# Patient Record
Sex: MALE | Race: WHITE | NOT HISPANIC OR LATINO | Employment: FULL TIME | URBAN - METROPOLITAN AREA
[De-identification: names, ages, dates, MRNs, and addresses within clinical notes are randomized per-mention and may not be internally consistent; named-entity substitution may affect disease eponyms.]

---

## 2017-03-01 ENCOUNTER — CLINICAL SUPPORT (OUTPATIENT)
Dept: CARDIOLOGY | Facility: CLINIC | Age: 57
End: 2017-03-01
Payer: COMMERCIAL

## 2017-03-01 ENCOUNTER — OFFICE VISIT (OUTPATIENT)
Dept: CARDIOLOGY | Facility: CLINIC | Age: 57
End: 2017-03-01
Payer: COMMERCIAL

## 2017-03-01 VITALS
HEIGHT: 67 IN | HEART RATE: 53 BPM | SYSTOLIC BLOOD PRESSURE: 116 MMHG | DIASTOLIC BLOOD PRESSURE: 80 MMHG | WEIGHT: 158 LBS | BODY MASS INDEX: 24.8 KG/M2

## 2017-03-01 DIAGNOSIS — Z82.49 FAMILY HISTORY OF PREMATURE CAD: ICD-10-CM

## 2017-03-01 DIAGNOSIS — E78.5 HYPERLIPIDEMIA, UNSPECIFIED HYPERLIPIDEMIA TYPE: Primary | ICD-10-CM

## 2017-03-01 DIAGNOSIS — E78.5 HYPERLIPIDEMIA, UNSPECIFIED HYPERLIPIDEMIA TYPE: ICD-10-CM

## 2017-03-01 DIAGNOSIS — I34.1 MITRAL VALVE PROLAPSE: ICD-10-CM

## 2017-03-01 PROCEDURE — 99212 OFFICE O/P EST SF 10 MIN: CPT | Mod: S$GLB,,, | Performed by: INTERNAL MEDICINE

## 2017-03-01 PROCEDURE — 1160F RVW MEDS BY RX/DR IN RCRD: CPT | Mod: S$GLB,,, | Performed by: INTERNAL MEDICINE

## 2017-03-01 PROCEDURE — 99999 PR PBB SHADOW E&M-EST. PATIENT-LVL III: CPT | Mod: PBBFAC,,, | Performed by: INTERNAL MEDICINE

## 2017-03-01 PROCEDURE — 93000 ELECTROCARDIOGRAM COMPLETE: CPT | Mod: S$GLB,,, | Performed by: INTERNAL MEDICINE

## 2017-03-01 NOTE — MR AVS SNAPSHOT
[facility] - Cardiology  [address] [facility] [place] [address]  Phone: [phone]  Fax: [phone]                  [place]   3/1/2017 11:15 AM   Office Visit    Description:  Male : 1960   Provider:  Elizabeth Suarez MD   Department:  [facility]           Diagnoses this Visit        Comments    Hyperlipidemia, unspecified hyperlipidemia type    -  Primary     Family history of premature CAD         Mitral valve prolapse                To Do List           Goals (5 Years of Data)     None      Follow-Up and Disposition     Return in about 1 year (around 3/1/2018).      [facility]     [facility] -  Assistance  Registered nurses in the [facility] provide clinical advisement, health education, appointment booking, and other advisory services.  Call for this free service at [phone].             Medications           Message regarding Medications     Verify the changes and/or additions to your medication regime listed below are the same as discussed with your clinician today.  If any of these changes or additions are incorrect, please notify your healthcare provider.             Verify that the below list of medications is an accurate representation of the medications you are currently taking.  If none reported, the list may be blank. If incorrect, please contact your healthcare provider. Carry this list with you in case of emergency.           Current Medications     aspirin (ECOTRIN) 81 MG EC tablet Take 1 tablet (81 mg total) by mouth once daily.    omeprazole (PRILOSEC) 20 MG capsule Take 20 mg by mouth once daily.    simvastatin (ZOCOR) 20 MG tablet TAKE 1 TABLET BY MOUTH EVERY EVENING           Clinical Reference Information           Your Vitals Were     BP                   116/80 (BP Location: Right arm, Patient Position: Sitting, BP Method: Manual)           Blood Pressure          Most Recent Value    Right Arm BP - Sitting  116/80     Left Arm BP - Sitting  112/78    BP  116/80      Allergies as of 3/1/2017     No Known Allergies      Immunizations Administered on Date of Encounter - 3/1/2017     None      Orders Placed During Today's Visit     Future Labs/Procedures Expected by Expires    Comprehensive metabolic panel  3/1/2017 (Approximate) 3/1/2018    Lipid panel  3/1/2017 (Approximate) 3/1/2018    SCHEDULED EKG 12-LEAD (to Muse)  As directed 3/1/2018      [facility] Sign-Up     Activating your [facility] account is as easy as 1-2-3!     1) Visit [email or web address], select Sign Up Now, enter this activation code and your date of birth, then select Next.  [email or web address]  Expires: 4/15/2017 11:54 AM      2) Create a username and password to use when you visit [facility] in the future and select a security question in case you lose your password and select Next.    3) Enter your e-mail address and click Sign Up!    Additional Information  If you have questions, please e-mail [email or web address] or call [phone] to talk to our [facility] staff. Remember, [facility] is NOT to be used for urgent needs. For medical emergencies, dial 911.         Language Assistance Services     ATTENTION: Language assistance services are available, free of charge. Please call [phone].      ATENCIÓN: Si habla español, tiene a floyd disposición servicios gratuitos de asistencia lingüística. Llame al [phone].     CHÚ Ý: N?u b?n nói Ti?ng Vi?t, có các d?ch v? h? tr? ngôn ng? mi?n phí dành cho b?n. G?i s? [phone].         [facility] complies with applicable Federal civil rights laws and does not discriminate on the basis of race, color, national origin, age, disability, or sex.

## 2017-03-01 NOTE — PROGRESS NOTES
Subjective:   Patient ID:  [place] is a 57 y.o. male who presents for follow up of No chief complaint on file.      HPI  A 56 yo male with premature fh cad he is here for f/u . He ahs been compliant with exercise diet busy at work not sleeping very well.asymptomatic cardiac wise ekg is normal.  Past Medical History:   Diagnosis Date    Family history of premature CAD 8/14/2013    Hyperlipidemia     Mitral valve prolapse        Past Surgical History:   Procedure Laterality Date    TONSILLECTOMY, ADENOIDECTOMY         Social History   Substance Use Topics    Smoking status: Never Smoker    Smokeless tobacco: Never Used    Alcohol use 3.6 oz/week     3 Cans of beer, 3 Shots of liquor per week       Family History   Problem Relation Age of Onset    Heart disease Father     Heart disease Brother     Heart disease Paternal Grandfather        Current Outpatient Prescriptions   Medication Sig    aspirin (ECOTRIN) 81 MG EC tablet Take 1 tablet (81 mg total) by mouth once daily.    omeprazole (PRILOSEC) 20 MG capsule Take 20 mg by mouth once daily.    simvastatin (ZOCOR) 20 MG tablet TAKE 1 TABLET BY MOUTH EVERY EVENING     No current facility-administered medications for this visit.      Current Outpatient Prescriptions on File Prior to Visit   Medication Sig    aspirin (ECOTRIN) 81 MG EC tablet Take 1 tablet (81 mg total) by mouth once daily.    omeprazole (PRILOSEC) 20 MG capsule Take 20 mg by mouth once daily.    simvastatin (ZOCOR) 20 MG tablet TAKE 1 TABLET BY MOUTH EVERY EVENING     No current facility-administered medications on file prior to visit.      Review of patient's allergies indicates:  No Known Allergies  Review of Systems   Constitution: Negative for weakness and malaise/fatigue.   HENT: Negative for headaches.    Eyes: Negative for blurred vision.   Cardiovascular: Negative for chest pain, claudication, cyanosis, dyspnea on exertion, irregular heartbeat, leg swelling, near-syncope,  orthopnea, palpitations and paroxysmal nocturnal dyspnea.   Respiratory: Negative for cough, hemoptysis and shortness of breath.    Hematologic/Lymphatic: Negative for bleeding problem. Does not bruise/bleed easily.   Skin: Negative for dry skin and itching.   Musculoskeletal: Negative for falls, muscle weakness and myalgias.   Gastrointestinal: Negative for abdominal pain, diarrhea, heartburn, hematemesis, hematochezia and melena.   Genitourinary: Negative for flank pain and hematuria.   Neurological: Negative for dizziness, focal weakness, light-headedness, numbness, paresthesias and seizures.   Psychiatric/Behavioral: Negative for altered mental status and memory loss. The patient is not nervous/anxious.    Allergic/Immunologic: Negative for hives.       Objective:   Physical Exam   Constitutional: He is oriented to person, place, and time. He appears well-developed and well-nourished. No distress.   HENT:   Head: Normocephalic and atraumatic.   Eyes: EOM are normal. Pupils are equal, round, and reactive to light. Right eye exhibits no discharge. Left eye exhibits no discharge.   Neck: Neck supple. No JVD present. No thyromegaly present.   Cardiovascular: Normal rate, regular rhythm, normal heart sounds and intact distal pulses.  Exam reveals no gallop and no friction rub.    No murmur heard.  Pulmonary/Chest: Effort normal and breath sounds normal. No respiratory distress. He has no wheezes. He has no rales. He exhibits no tenderness.   Abdominal: Soft. Bowel sounds are normal. He exhibits no distension. There is no tenderness.   Musculoskeletal: Normal range of motion. He exhibits no edema.   Neurological: He is alert and oriented to person, place, and time. No cranial nerve deficit.   Skin: Skin is warm and dry. No rash noted. He is not diaphoretic. No erythema.   Psychiatric: He has a normal mood and affect. His behavior is normal.   Nursing note and vitals reviewed.    Vitals:    03/01/17 1133   BP: 116/80  "  BP Location: Right arm   Patient Position: Sitting   BP Method: Manual   Pulse: (!) 53   Weight: 71.7 kg (158 lb)   Height: 5' 7" (1.702 m)     Lab Results   Component Value Date    CHOL 150 02/29/2016    CHOL 150 08/29/2015    CHOL 141 08/18/2014     Lab Results   Component Value Date    HDL 55 02/29/2016    HDL 55 08/29/2015    HDL 55 08/18/2014     Lab Results   Component Value Date    LDLCALC 83.6 02/29/2016    LDLCALC 81.6 08/29/2015    LDLCALC 75.2 08/18/2014     Lab Results   Component Value Date    TRIG 57 02/29/2016    TRIG 67 08/29/2015    TRIG 54 08/18/2014     Lab Results   Component Value Date    CHOLHDL 36.7 02/29/2016    CHOLHDL 36.7 08/29/2015    CHOLHDL 39.0 08/18/2014       Chemistry        Component Value Date/Time     02/29/2016 0753    K 4.0 02/29/2016 0753     02/29/2016 0753    CO2 26 02/29/2016 0753    BUN 12 02/29/2016 0753    CREATININE 1.0 02/29/2016 0753    GLU 97 02/29/2016 0753        Component Value Date/Time    CALCIUM 9.0 02/29/2016 0753    ALKPHOS 63 02/29/2016 0753    AST 21 02/29/2016 0753    ALT 32 02/29/2016 0753    BILITOT 1.4 (H) 02/29/2016 0753          Lab Results   Component Value Date    TSH 2.446 08/29/2015     No results found for: INR, PROTIME  Lab Results   Component Value Date    WBC 4.75 08/29/2015    HGB 14.3 08/29/2015    HCT 41.1 08/29/2015    MCV 93 08/29/2015     08/29/2015     BMP  Sodium   Date Value Ref Range Status   02/29/2016 142 136 - 145 mmol/L Final     Potassium   Date Value Ref Range Status   02/29/2016 4.0 3.5 - 5.1 mmol/L Final     Chloride   Date Value Ref Range Status   02/29/2016 108 95 - 110 mmol/L Final     CO2   Date Value Ref Range Status   02/29/2016 26 23 - 29 mmol/L Final     BUN, Bld   Date Value Ref Range Status   02/29/2016 12 6 - 20 mg/dL Final     Creatinine   Date Value Ref Range Status   02/29/2016 1.0 0.5 - 1.4 mg/dL Final     Calcium   Date Value Ref Range Status   02/29/2016 9.0 8.7 - 10.5 mg/dL Final "     Anion Gap   Date Value Ref Range Status   02/29/2016 8 8 - 16 mmol/L Final     eGFR if    Date Value Ref Range Status   02/29/2016 >60.0 >60 mL/min/1.73 m^2 Final     eGFR if non    Date Value Ref Range Status   02/29/2016 >60.0 >60 mL/min/1.73 m^2 Final     Comment:     Calculation used to obtain the estimated glomerular filtration  rate (eGFR) is the CKD-EPI equation. Since race is unknown   in our information system, the eGFR values for   -American and Non--American patients are given   for each creatinine result.       CrCl cannot be calculated (Patient has no serum creatinine result on file.).    Assessment:     1. Hyperlipidemia, unspecified hyperlipidemia type    2. Family history of premature CAD    3. Mitral valve prolapse      Stable needs labs.   Plan:     Continue current therapy  Cardiac low salt diet.  Risk factor modification and excercise program.  Yearly f/u   Needs labs .

## 2017-08-02 RX ORDER — SIMVASTATIN 20 MG/1
TABLET, FILM COATED ORAL
Qty: 30 TABLET | Refills: 0 | Status: SHIPPED | OUTPATIENT
Start: 2017-08-02 | End: 2017-10-03 | Stop reason: SDUPTHER

## 2017-10-03 RX ORDER — SIMVASTATIN 20 MG/1
TABLET, FILM COATED ORAL
Qty: 30 TABLET | Refills: 6 | Status: SHIPPED | OUTPATIENT
Start: 2017-10-03 | End: 2018-05-09 | Stop reason: SDUPTHER

## 2018-02-19 ENCOUNTER — LAB VISIT (OUTPATIENT)
Dept: LAB | Facility: HOSPITAL | Age: 58
End: 2018-02-19
Attending: INTERNAL MEDICINE
Payer: COMMERCIAL

## 2018-02-19 DIAGNOSIS — E78.5 HYPERLIPIDEMIA, UNSPECIFIED HYPERLIPIDEMIA TYPE: ICD-10-CM

## 2018-02-19 LAB
ALBUMIN SERPL BCP-MCNC: 3.9 G/DL
ALP SERPL-CCNC: 61 U/L
ALT SERPL W/O P-5'-P-CCNC: 37 U/L
ANION GAP SERPL CALC-SCNC: 8 MMOL/L
AST SERPL-CCNC: 26 U/L
BILIRUB SERPL-MCNC: 2.2 MG/DL
BUN SERPL-MCNC: 13 MG/DL
CALCIUM SERPL-MCNC: 9.1 MG/DL
CHLORIDE SERPL-SCNC: 108 MMOL/L
CHOLEST SERPL-MCNC: 147 MG/DL
CHOLEST/HDLC SERPL: 2.5 {RATIO}
CO2 SERPL-SCNC: 25 MMOL/L
CREAT SERPL-MCNC: 1.1 MG/DL
EST. GFR  (AFRICAN AMERICAN): >60 ML/MIN/1.73 M^2
EST. GFR  (NON AFRICAN AMERICAN): >60 ML/MIN/1.73 M^2
GLUCOSE SERPL-MCNC: 80 MG/DL
HDLC SERPL-MCNC: 60 MG/DL
HDLC SERPL: 40.8 %
LDLC SERPL CALC-MCNC: 77 MG/DL
NONHDLC SERPL-MCNC: 87 MG/DL
POTASSIUM SERPL-SCNC: 4.1 MMOL/L
PROT SERPL-MCNC: 6.5 G/DL
SODIUM SERPL-SCNC: 141 MMOL/L
TRIGL SERPL-MCNC: 50 MG/DL

## 2018-02-19 PROCEDURE — 36415 COLL VENOUS BLD VENIPUNCTURE: CPT | Mod: PO

## 2018-02-19 PROCEDURE — 80053 COMPREHEN METABOLIC PANEL: CPT

## 2018-02-19 PROCEDURE — 80061 LIPID PANEL: CPT

## 2018-02-20 DIAGNOSIS — I34.1 MITRAL VALVE PROLAPSE: Primary | ICD-10-CM

## 2018-02-21 ENCOUNTER — CLINICAL SUPPORT (OUTPATIENT)
Dept: CARDIOLOGY | Facility: CLINIC | Age: 58
End: 2018-02-21
Payer: COMMERCIAL

## 2018-02-21 ENCOUNTER — OFFICE VISIT (OUTPATIENT)
Dept: CARDIOLOGY | Facility: CLINIC | Age: 58
End: 2018-02-21
Payer: COMMERCIAL

## 2018-02-21 VITALS
WEIGHT: 160 LBS | HEART RATE: 60 BPM | BODY MASS INDEX: 25.11 KG/M2 | HEIGHT: 67 IN | DIASTOLIC BLOOD PRESSURE: 60 MMHG | SYSTOLIC BLOOD PRESSURE: 102 MMHG

## 2018-02-21 DIAGNOSIS — Z82.49 FAMILY HISTORY OF PREMATURE CAD: ICD-10-CM

## 2018-02-21 DIAGNOSIS — I34.1 MITRAL VALVE PROLAPSE: ICD-10-CM

## 2018-02-21 DIAGNOSIS — E78.5 HYPERLIPIDEMIA, UNSPECIFIED HYPERLIPIDEMIA TYPE: Primary | ICD-10-CM

## 2018-02-21 PROCEDURE — 99999 PR PBB SHADOW E&M-EST. PATIENT-LVL III: CPT | Mod: PBBFAC,,, | Performed by: INTERNAL MEDICINE

## 2018-02-21 PROCEDURE — 3008F BODY MASS INDEX DOCD: CPT | Mod: S$GLB,,, | Performed by: INTERNAL MEDICINE

## 2018-02-21 PROCEDURE — 93000 ELECTROCARDIOGRAM COMPLETE: CPT | Mod: S$GLB,,, | Performed by: INTERNAL MEDICINE

## 2018-02-21 PROCEDURE — 99212 OFFICE O/P EST SF 10 MIN: CPT | Mod: S$GLB,,, | Performed by: INTERNAL MEDICINE

## 2018-02-22 NOTE — PROGRESS NOTES
Subjective:   Patient ID:  [place] is a 58 y.o. male who presents for follow up of Mitral Valve Prolapse (Patient presents to clinic today for annual cardiology follow up. ) and Hyperlipidemia      HPI  A 57 yo male with hlp is here fro f/u he is exercuising complaint with diet takes meds lipids on target. Has no cardiovascular symptoms ekg normal.  Past Medical History:   Diagnosis Date    Family history of premature CAD 8/14/2013    Hyperlipidemia     Mitral valve prolapse        Past Surgical History:   Procedure Laterality Date    TONSILLECTOMY, ADENOIDECTOMY         Social History   Substance Use Topics    Smoking status: Never Smoker    Smokeless tobacco: Never Used    Alcohol use 3.6 oz/week     3 Cans of beer, 3 Shots of liquor per week       Family History   Problem Relation Age of Onset    Heart disease Father     Heart disease Brother     Heart disease Paternal Grandfather        Current Outpatient Prescriptions   Medication Sig    aspirin (ECOTRIN) 81 MG EC tablet Take 1 tablet (81 mg total) by mouth once daily.    omeprazole (PRILOSEC) 20 MG capsule Take 20 mg by mouth once daily.    simvastatin (ZOCOR) 20 MG tablet TAKE 1 TABLET BY MOUTH EVERY EVENING     No current facility-administered medications for this visit.      Current Outpatient Prescriptions on File Prior to Visit   Medication Sig    aspirin (ECOTRIN) 81 MG EC tablet Take 1 tablet (81 mg total) by mouth once daily.    omeprazole (PRILOSEC) 20 MG capsule Take 20 mg by mouth once daily.    simvastatin (ZOCOR) 20 MG tablet TAKE 1 TABLET BY MOUTH EVERY EVENING     No current facility-administered medications on file prior to visit.      Review of patient's allergies indicates:  No Known Allergies  Review of Systems   Constitution: Negative for weakness and malaise/fatigue.   Eyes: Negative for blurred vision.   Cardiovascular: Negative for chest pain, claudication, cyanosis, dyspnea on exertion, irregular heartbeat, leg  swelling, near-syncope, orthopnea, palpitations and paroxysmal nocturnal dyspnea.   Respiratory: Negative for cough, hemoptysis and shortness of breath.    Hematologic/Lymphatic: Negative for bleeding problem. Does not bruise/bleed easily.   Skin: Negative for dry skin and itching.   Musculoskeletal: Negative for falls, muscle weakness and myalgias.   Gastrointestinal: Negative for abdominal pain, diarrhea, heartburn, hematemesis, hematochezia and melena.   Genitourinary: Negative for flank pain and hematuria.   Neurological: Negative for dizziness, focal weakness, headaches, light-headedness, numbness, paresthesias and seizures.   Psychiatric/Behavioral: Negative for altered mental status and memory loss. The patient is not nervous/anxious.    Allergic/Immunologic: Negative for hives.       Objective:   Physical Exam   Constitutional: He is oriented to person, place, and time. He appears well-developed and well-nourished. No distress.   HENT:   Head: Normocephalic and atraumatic.   Eyes: EOM are normal. Pupils are equal, round, and reactive to light. Right eye exhibits no discharge. Left eye exhibits no discharge.   Neck: Neck supple. No JVD present. No thyromegaly present.   Cardiovascular: Normal rate, regular rhythm, normal heart sounds and intact distal pulses.  Exam reveals no gallop and no friction rub.    No murmur heard.  Pulmonary/Chest: Effort normal and breath sounds normal. No respiratory distress. He has no wheezes. He has no rales. He exhibits no tenderness.   Abdominal: Soft. Bowel sounds are normal. He exhibits no distension. There is no tenderness.   Musculoskeletal: Normal range of motion. He exhibits no edema.   Neurological: He is alert and oriented to person, place, and time. No cranial nerve deficit.   Skin: Skin is warm and dry. No rash noted. He is not diaphoretic. No erythema.   Psychiatric: He has a normal mood and affect. His behavior is normal.   Nursing note and vitals  "reviewed.    Vitals:    02/21/18 1735   BP: 102/60   Pulse: 60   Weight: 72.6 kg (160 lb)   Height: 5' 7" (1.702 m)     Lab Results   Component Value Date    CHOL 147 02/19/2018    CHOL 150 02/29/2016    CHOL 150 08/29/2015     Lab Results   Component Value Date    HDL 60 02/19/2018    HDL 55 02/29/2016    HDL 55 08/29/2015     Lab Results   Component Value Date    LDLCALC 77.0 02/19/2018    LDLCALC 83.6 02/29/2016    LDLCALC 81.6 08/29/2015     Lab Results   Component Value Date    TRIG 50 02/19/2018    TRIG 57 02/29/2016    TRIG 67 08/29/2015     Lab Results   Component Value Date    CHOLHDL 40.8 02/19/2018    CHOLHDL 36.7 02/29/2016    CHOLHDL 36.7 08/29/2015       Chemistry        Component Value Date/Time     02/19/2018 0751    K 4.1 02/19/2018 0751     02/19/2018 0751    CO2 25 02/19/2018 0751    BUN 13 02/19/2018 0751    CREATININE 1.1 02/19/2018 0751    GLU 80 02/19/2018 0751        Component Value Date/Time    CALCIUM 9.1 02/19/2018 0751    ALKPHOS 61 02/19/2018 0751    AST 26 02/19/2018 0751    ALT 37 02/19/2018 0751    BILITOT 2.2 (H) 02/19/2018 0751    ESTGFRAFRICA >60.0 02/19/2018 0751    EGFRNONAA >60.0 02/19/2018 0751          Lab Results   Component Value Date    TSH 2.446 08/29/2015     No results found for: INR, PROTIME  Lab Results   Component Value Date    WBC 4.75 08/29/2015    HGB 14.3 08/29/2015    HCT 41.1 08/29/2015    MCV 93 08/29/2015     08/29/2015     BMP  Sodium   Date Value Ref Range Status   02/19/2018 141 136 - 145 mmol/L Final     Potassium   Date Value Ref Range Status   02/19/2018 4.1 3.5 - 5.1 mmol/L Final     Chloride   Date Value Ref Range Status   02/19/2018 108 95 - 110 mmol/L Final     CO2   Date Value Ref Range Status   02/19/2018 25 23 - 29 mmol/L Final     BUN, Bld   Date Value Ref Range Status   02/19/2018 13 6 - 20 mg/dL Final     Creatinine   Date Value Ref Range Status   02/19/2018 1.1 0.5 - 1.4 mg/dL Final     Calcium   Date Value Ref Range Status "   02/19/2018 9.1 8.7 - 10.5 mg/dL Final     Anion Gap   Date Value Ref Range Status   02/19/2018 8 8 - 16 mmol/L Final     eGFR if    Date Value Ref Range Status   02/19/2018 >60.0 >60 mL/min/1.73 m^2 Final     eGFR if non    Date Value Ref Range Status   02/19/2018 >60.0 >60 mL/min/1.73 m^2 Final     Comment:     Calculation used to obtain the estimated glomerular filtration  rate (eGFR) is the CKD-EPI equation.        Estimated Creatinine Clearance: 69.3 mL/min (based on SCr of 1.1 mg/dL).    Assessment:     1. Hyperlipidemia, unspecified hyperlipidemia type    2. Mitral valve prolapse    3. Family history of premature CAD      Asymptomatic doing well clinically.  Plan:   Continue current therapy  Cardiac low salt diet.  Risk factor modification and excercise program.  F/u in 1 year   Needs 6 months lipids cmp.

## 2018-05-09 RX ORDER — SIMVASTATIN 20 MG/1
TABLET, FILM COATED ORAL
Qty: 30 TABLET | Refills: 0 | Status: SHIPPED | OUTPATIENT
Start: 2018-05-09 | End: 2018-07-15 | Stop reason: SDUPTHER

## 2018-07-15 RX ORDER — SIMVASTATIN 20 MG/1
TABLET, FILM COATED ORAL
Qty: 30 TABLET | Refills: 0 | Status: SHIPPED | OUTPATIENT
Start: 2018-07-15 | End: 2018-08-20 | Stop reason: SDUPTHER

## 2018-08-20 RX ORDER — SIMVASTATIN 20 MG/1
TABLET, FILM COATED ORAL
Qty: 30 TABLET | Refills: 6 | Status: SHIPPED | OUTPATIENT
Start: 2018-08-20 | End: 2019-06-27 | Stop reason: SDUPTHER

## 2019-03-22 ENCOUNTER — LAB VISIT (OUTPATIENT)
Dept: LAB | Facility: HOSPITAL | Age: 59
End: 2019-03-22
Attending: INTERNAL MEDICINE
Payer: COMMERCIAL

## 2019-03-22 DIAGNOSIS — E78.5 HYPERLIPIDEMIA, UNSPECIFIED HYPERLIPIDEMIA TYPE: ICD-10-CM

## 2019-03-22 DIAGNOSIS — I25.10 CORONARY ARTERY DISEASE, ANGINA PRESENCE UNSPECIFIED, UNSPECIFIED VESSEL OR LESION TYPE, UNSPECIFIED WHETHER NATIVE OR TRANSPLANTED HEART: ICD-10-CM

## 2019-03-22 DIAGNOSIS — E78.5 HYPERLIPIDEMIA, UNSPECIFIED HYPERLIPIDEMIA TYPE: Primary | ICD-10-CM

## 2019-03-22 LAB
ALBUMIN SERPL BCP-MCNC: 4.2 G/DL
ALP SERPL-CCNC: 74 U/L
ALT SERPL W/O P-5'-P-CCNC: 35 U/L
ANION GAP SERPL CALC-SCNC: 9 MMOL/L
AST SERPL-CCNC: 24 U/L
BILIRUB SERPL-MCNC: 1.9 MG/DL
BUN SERPL-MCNC: 14 MG/DL
CALCIUM SERPL-MCNC: 9.6 MG/DL
CHLORIDE SERPL-SCNC: 106 MMOL/L
CHOLEST SERPL-MCNC: 170 MG/DL
CHOLEST/HDLC SERPL: 2.7 {RATIO}
CO2 SERPL-SCNC: 26 MMOL/L
CREAT SERPL-MCNC: 1.1 MG/DL
EST. GFR  (AFRICAN AMERICAN): >60 ML/MIN/1.73 M^2
EST. GFR  (NON AFRICAN AMERICAN): >60 ML/MIN/1.73 M^2
GLUCOSE SERPL-MCNC: 83 MG/DL
HDLC SERPL-MCNC: 64 MG/DL
HDLC SERPL: 37.6 %
LDLC SERPL CALC-MCNC: 92 MG/DL
NONHDLC SERPL-MCNC: 106 MG/DL
POTASSIUM SERPL-SCNC: 4.1 MMOL/L
PROT SERPL-MCNC: 6.7 G/DL
SODIUM SERPL-SCNC: 141 MMOL/L
TRIGL SERPL-MCNC: 70 MG/DL

## 2019-03-22 PROCEDURE — 80053 COMPREHEN METABOLIC PANEL: CPT

## 2019-03-22 PROCEDURE — 80061 LIPID PANEL: CPT

## 2019-03-22 PROCEDURE — 36415 COLL VENOUS BLD VENIPUNCTURE: CPT

## 2019-03-26 DIAGNOSIS — I34.1 MITRAL VALVE PROLAPSE: Primary | ICD-10-CM

## 2019-03-27 ENCOUNTER — CLINICAL SUPPORT (OUTPATIENT)
Dept: CARDIOLOGY | Facility: CLINIC | Age: 59
End: 2019-03-27
Payer: COMMERCIAL

## 2019-03-27 ENCOUNTER — OFFICE VISIT (OUTPATIENT)
Dept: CARDIOLOGY | Facility: CLINIC | Age: 59
End: 2019-03-27
Payer: COMMERCIAL

## 2019-03-27 VITALS
WEIGHT: 158 LBS | HEART RATE: 55 BPM | SYSTOLIC BLOOD PRESSURE: 108 MMHG | DIASTOLIC BLOOD PRESSURE: 66 MMHG | HEIGHT: 67 IN | BODY MASS INDEX: 24.8 KG/M2

## 2019-03-27 DIAGNOSIS — I34.1 MITRAL VALVE PROLAPSE: ICD-10-CM

## 2019-03-27 DIAGNOSIS — Z82.49 FAMILY HISTORY OF PREMATURE CAD: ICD-10-CM

## 2019-03-27 DIAGNOSIS — E78.5 HYPERLIPIDEMIA, UNSPECIFIED HYPERLIPIDEMIA TYPE: Primary | ICD-10-CM

## 2019-03-27 DIAGNOSIS — R55 SYNCOPE AND COLLAPSE: ICD-10-CM

## 2019-03-27 PROCEDURE — 3008F BODY MASS INDEX DOCD: CPT | Mod: CPTII,S$GLB,, | Performed by: INTERNAL MEDICINE

## 2019-03-27 PROCEDURE — 99999 PR PBB SHADOW E&M-EST. PATIENT-LVL III: ICD-10-PCS | Mod: PBBFAC,,, | Performed by: INTERNAL MEDICINE

## 2019-03-27 PROCEDURE — 3008F PR BODY MASS INDEX (BMI) DOCUMENTED: ICD-10-PCS | Mod: CPTII,S$GLB,, | Performed by: INTERNAL MEDICINE

## 2019-03-27 PROCEDURE — 93000 EKG 12-LEAD: ICD-10-PCS | Mod: S$GLB,,, | Performed by: INTERNAL MEDICINE

## 2019-03-27 PROCEDURE — 99214 OFFICE O/P EST MOD 30 MIN: CPT | Mod: S$GLB,,, | Performed by: INTERNAL MEDICINE

## 2019-03-27 PROCEDURE — 99999 PR PBB SHADOW E&M-EST. PATIENT-LVL III: CPT | Mod: PBBFAC,,, | Performed by: INTERNAL MEDICINE

## 2019-03-27 PROCEDURE — 93000 ELECTROCARDIOGRAM COMPLETE: CPT | Mod: S$GLB,,, | Performed by: INTERNAL MEDICINE

## 2019-03-27 PROCEDURE — 99214 PR OFFICE/OUTPT VISIT, EST, LEVL IV, 30-39 MIN: ICD-10-PCS | Mod: S$GLB,,, | Performed by: INTERNAL MEDICINE

## 2019-03-27 NOTE — PROGRESS NOTES
Subjective:   Patient ID:  Tucker Bay is a 59 y.o. male who presents for follow up of Annual Exam; Hyperlipidemia; and Loss of Consciousness      HPI  A 58 yo male with fh cad hyperlipidemia is here for f/u. He ahd an episode of fainting after being in hot place after drinking few margueritas ate and probably got vagal. He has no recurrent symptoms. He exercises three times weekly walks daily ha ssome knee pain no chest pain shortness of breath.has been eating more than usual not compliant.has not been taking statins regularily.  Past Medical History:   Diagnosis Date    Family history of premature CAD 8/14/2013    Hyperlipidemia     Mitral valve prolapse        Past Surgical History:   Procedure Laterality Date    TONSILLECTOMY, ADENOIDECTOMY         Social History     Tobacco Use    Smoking status: Never Smoker    Smokeless tobacco: Never Used   Substance Use Topics    Alcohol use: Yes     Alcohol/week: 3.6 oz     Types: 3 Cans of beer, 3 Shots of liquor per week    Drug use: No       Family History   Problem Relation Age of Onset    Heart disease Father     Heart disease Brother     Heart disease Paternal Grandfather        Current Outpatient Medications   Medication Sig    aspirin (ECOTRIN) 81 MG EC tablet Take 1 tablet (81 mg total) by mouth once daily.    omeprazole (PRILOSEC) 20 MG capsule Take 20 mg by mouth once daily.    simvastatin (ZOCOR) 20 MG tablet TAKE 1 TABLET BY MOUTH EVERY EVENING     No current facility-administered medications for this visit.      Current Outpatient Medications on File Prior to Visit   Medication Sig    aspirin (ECOTRIN) 81 MG EC tablet Take 1 tablet (81 mg total) by mouth once daily.    omeprazole (PRILOSEC) 20 MG capsule Take 20 mg by mouth once daily.    simvastatin (ZOCOR) 20 MG tablet TAKE 1 TABLET BY MOUTH EVERY EVENING     No current facility-administered medications on file prior to visit.      Review of patient's allergies indicates:  No Known  Allergies  Review of Systems   Constitution: Negative for malaise/fatigue.   Eyes: Negative for blurred vision.   Cardiovascular: Negative for chest pain, claudication, cyanosis, dyspnea on exertion, irregular heartbeat, leg swelling, near-syncope, orthopnea, palpitations and paroxysmal nocturnal dyspnea.   Respiratory: Negative for cough, hemoptysis and shortness of breath.    Hematologic/Lymphatic: Negative for bleeding problem. Does not bruise/bleed easily.   Skin: Negative for dry skin and itching.   Musculoskeletal: Positive for arthritis and joint pain. Negative for falls, muscle weakness and myalgias.   Gastrointestinal: Negative for abdominal pain, diarrhea, heartburn, hematemesis, hematochezia and melena.   Genitourinary: Negative for flank pain and hematuria.   Neurological: Negative for dizziness, focal weakness, headaches, light-headedness, numbness, paresthesias, seizures and weakness.   Psychiatric/Behavioral: Negative for altered mental status and memory loss. The patient is not nervous/anxious.    Allergic/Immunologic: Negative for hives.       Objective:   Physical Exam   Constitutional: He is oriented to person, place, and time. He appears well-developed and well-nourished. No distress.   HENT:   Head: Normocephalic and atraumatic.   Eyes: Pupils are equal, round, and reactive to light. EOM are normal. Right eye exhibits no discharge. Left eye exhibits no discharge.   Neck: Neck supple. No JVD present. No thyromegaly present.   Cardiovascular: Normal rate, regular rhythm, normal heart sounds and intact distal pulses. Exam reveals no gallop and no friction rub.   No murmur heard.  Pulmonary/Chest: Effort normal and breath sounds normal. No respiratory distress. He has no wheezes. He has no rales. He exhibits no tenderness.   Abdominal: Soft. Bowel sounds are normal. He exhibits no distension. There is no tenderness.   Musculoskeletal: Normal range of motion. He exhibits no edema.   Neurological: He  "is alert and oriented to person, place, and time. No cranial nerve deficit.   Skin: Skin is warm and dry. No rash noted. He is not diaphoretic. No erythema.   Psychiatric: He has a normal mood and affect. His behavior is normal.   Nursing note and vitals reviewed.    Vitals:    03/27/19 1544   BP: 108/66   BP Location: Right arm   Patient Position: Sitting   BP Method: Medium (Automatic)   Pulse: (!) 55   Weight: 71.7 kg (158 lb)   Height: 5' 7" (1.702 m)     Lab Results   Component Value Date    CHOL 170 03/22/2019    CHOL 147 02/19/2018    CHOL 150 02/29/2016     Lab Results   Component Value Date    HDL 64 03/22/2019    HDL 60 02/19/2018    HDL 55 02/29/2016     Lab Results   Component Value Date    LDLCALC 92.0 03/22/2019    LDLCALC 77.0 02/19/2018    LDLCALC 83.6 02/29/2016     Lab Results   Component Value Date    TRIG 70 03/22/2019    TRIG 50 02/19/2018    TRIG 57 02/29/2016     Lab Results   Component Value Date    CHOLHDL 37.6 03/22/2019    CHOLHDL 40.8 02/19/2018    CHOLHDL 36.7 02/29/2016       Chemistry        Component Value Date/Time     03/22/2019 0820    K 4.1 03/22/2019 0820     03/22/2019 0820    CO2 26 03/22/2019 0820    BUN 14 03/22/2019 0820    CREATININE 1.1 03/22/2019 0820    GLU 83 03/22/2019 0820        Component Value Date/Time    CALCIUM 9.6 03/22/2019 0820    ALKPHOS 74 03/22/2019 0820    AST 24 03/22/2019 0820    ALT 35 03/22/2019 0820    BILITOT 1.9 (H) 03/22/2019 0820    ESTGFRAFRICA >60.0 03/22/2019 0820    EGFRNONAA >60.0 03/22/2019 0820          Lab Results   Component Value Date    TSH 2.446 08/29/2015     No results found for: INR, PROTIME  Lab Results   Component Value Date    WBC 4.75 08/29/2015    HGB 14.3 08/29/2015    HCT 41.1 08/29/2015    MCV 93 08/29/2015     08/29/2015     BMP  Sodium   Date Value Ref Range Status   03/22/2019 141 136 - 145 mmol/L Final     Potassium   Date Value Ref Range Status   03/22/2019 4.1 3.5 - 5.1 mmol/L Final     Chloride "   Date Value Ref Range Status   03/22/2019 106 95 - 110 mmol/L Final     CO2   Date Value Ref Range Status   03/22/2019 26 23 - 29 mmol/L Final     BUN, Bld   Date Value Ref Range Status   03/22/2019 14 6 - 20 mg/dL Final     Creatinine   Date Value Ref Range Status   03/22/2019 1.1 0.5 - 1.4 mg/dL Final     Calcium   Date Value Ref Range Status   03/22/2019 9.6 8.7 - 10.5 mg/dL Final     Anion Gap   Date Value Ref Range Status   03/22/2019 9 8 - 16 mmol/L Final     eGFR if    Date Value Ref Range Status   03/22/2019 >60.0 >60 mL/min/1.73 m^2 Final     eGFR if non    Date Value Ref Range Status   03/22/2019 >60.0 >60 mL/min/1.73 m^2 Final     Comment:     Calculation used to obtain the estimated glomerular filtration  rate (eGFR) is the CKD-EPI equation.        Estimated Creatinine Clearance: 68.4 mL/min (based on SCr of 1.1 mg/dL).    Assessment:     1. Hyperlipidemia, unspecified hyperlipidemia type    2. Mitral valve prolapse    3. Family history of premature CAD      Had a syncopal episode premature fh cad hlp . Will get stress echo.     Plan:   Stress echo  Continue current therapy  Cardiac low salt diet.  Risk factor modification and excercise program.  F/u in 1 year   6 months lipid cmp phone review

## 2019-04-09 ENCOUNTER — DOCUMENTATION ONLY (OUTPATIENT)
Dept: CARDIOLOGY | Facility: CLINIC | Age: 59
End: 2019-04-09

## 2019-04-09 ENCOUNTER — CLINICAL SUPPORT (OUTPATIENT)
Dept: CARDIOLOGY | Facility: CLINIC | Age: 59
End: 2019-04-09
Attending: INTERNAL MEDICINE
Payer: COMMERCIAL

## 2019-04-09 ENCOUNTER — TELEPHONE (OUTPATIENT)
Dept: CARDIOLOGY | Facility: CLINIC | Age: 59
End: 2019-04-09

## 2019-04-09 DIAGNOSIS — I34.1 MITRAL VALVE PROLAPSE: ICD-10-CM

## 2019-04-09 DIAGNOSIS — E78.5 HYPERLIPIDEMIA, UNSPECIFIED HYPERLIPIDEMIA TYPE: ICD-10-CM

## 2019-04-09 DIAGNOSIS — R55 SYNCOPE AND COLLAPSE: ICD-10-CM

## 2019-04-09 DIAGNOSIS — Z82.49 FAMILY HISTORY OF PREMATURE CAD: ICD-10-CM

## 2019-04-09 LAB
DIASTOLIC DYSFUNCTION: NO
ESTIMATED PA SYSTOLIC PRESSURE: 21.15
MITRAL VALVE REGURGITATION: NORMAL
RETIRED EF AND QEF - SEE NOTES: 60 (ref 55–65)

## 2019-04-09 PROCEDURE — 93320 EXERCISE STRESS ECHO WITH COLOR FLOW: ICD-10-PCS | Mod: S$GLB,,, | Performed by: INTERNAL MEDICINE

## 2019-04-09 PROCEDURE — 93325 EXERCISE STRESS ECHO WITH COLOR FLOW: ICD-10-PCS | Mod: S$GLB,,, | Performed by: INTERNAL MEDICINE

## 2019-04-09 PROCEDURE — 93351 STRESS TTE COMPLETE: CPT | Mod: S$GLB,,, | Performed by: INTERNAL MEDICINE

## 2019-04-09 PROCEDURE — 93320 DOPPLER ECHO COMPLETE: CPT | Mod: S$GLB,,, | Performed by: INTERNAL MEDICINE

## 2019-04-09 PROCEDURE — 93325 DOPPLER ECHO COLOR FLOW MAPG: CPT | Mod: S$GLB,,, | Performed by: INTERNAL MEDICINE

## 2019-04-09 PROCEDURE — 93351 EXERCISE STRESS ECHO WITH COLOR FLOW: ICD-10-PCS | Mod: S$GLB,,, | Performed by: INTERNAL MEDICINE

## 2019-04-09 NOTE — TELEPHONE ENCOUNTER
Pt presented today for a Stress Echo. >2mm horizontal ST depression noted INF/LAT leads- no chest pain. Short run of A-fib also noted on in Recovery. Dr. Grimes reviewed EKG tracings. Pt okay to leave.

## 2019-05-10 ENCOUNTER — TELEPHONE (OUTPATIENT)
Dept: CARDIOLOGY | Facility: CLINIC | Age: 59
End: 2019-05-10

## 2019-05-10 DIAGNOSIS — I48.91 ATRIAL FIBRILLATION, UNSPECIFIED TYPE: ICD-10-CM

## 2019-05-10 DIAGNOSIS — E78.5 HYPERLIPIDEMIA, UNSPECIFIED HYPERLIPIDEMIA TYPE: Primary | ICD-10-CM

## 2019-05-10 NOTE — TELEPHONE ENCOUNTER
----- Message from Elizabeth Suarez MD sent at 4/9/2019  9:59 PM CDT -----  He had afib with stress test. He has no change in exercise capacity compared to last one 6 years ago. No definite ischemia.   Due to the afib would like to get a holter scheduled. Please and follow up afterwards

## 2019-06-26 ENCOUNTER — PATIENT MESSAGE (OUTPATIENT)
Dept: CARDIOLOGY | Facility: CLINIC | Age: 59
End: 2019-06-26

## 2019-06-27 RX ORDER — SIMVASTATIN 20 MG/1
20 TABLET, FILM COATED ORAL NIGHTLY
Qty: 30 TABLET | Refills: 6 | Status: SHIPPED | OUTPATIENT
Start: 2019-06-27 | End: 2020-01-14

## 2020-01-14 RX ORDER — SIMVASTATIN 20 MG/1
TABLET, FILM COATED ORAL
Qty: 30 TABLET | Refills: 6 | Status: SHIPPED | OUTPATIENT
Start: 2020-01-14 | End: 2020-12-09 | Stop reason: SDUPTHER

## 2020-02-11 ENCOUNTER — OFFICE VISIT (OUTPATIENT)
Dept: INTERNAL MEDICINE | Facility: CLINIC | Age: 60
End: 2020-02-11
Payer: COMMERCIAL

## 2020-02-11 VITALS
OXYGEN SATURATION: 97 % | TEMPERATURE: 97 F | BODY MASS INDEX: 25.33 KG/M2 | SYSTOLIC BLOOD PRESSURE: 118 MMHG | HEIGHT: 67 IN | DIASTOLIC BLOOD PRESSURE: 74 MMHG | HEART RATE: 59 BPM | WEIGHT: 161.38 LBS

## 2020-02-11 DIAGNOSIS — Z23 NEED FOR DIPHTHERIA-TETANUS-PERTUSSIS (TDAP) VACCINE: ICD-10-CM

## 2020-02-11 DIAGNOSIS — Z00.00 ROUTINE GENERAL MEDICAL EXAMINATION AT A HEALTH CARE FACILITY: Primary | ICD-10-CM

## 2020-02-11 DIAGNOSIS — E78.5 HYPERLIPIDEMIA, UNSPECIFIED HYPERLIPIDEMIA TYPE: ICD-10-CM

## 2020-02-11 DIAGNOSIS — R09.A2 GLOBUS SENSATION: ICD-10-CM

## 2020-02-11 PROCEDURE — 99999 PR PBB SHADOW E&M-EST. PATIENT-LVL IV: ICD-10-PCS | Mod: PBBFAC,,, | Performed by: INTERNAL MEDICINE

## 2020-02-11 PROCEDURE — 99386 PREV VISIT NEW AGE 40-64: CPT | Mod: 25,S$GLB,, | Performed by: INTERNAL MEDICINE

## 2020-02-11 PROCEDURE — 90471 IMMUNIZATION ADMIN: CPT | Mod: S$GLB,,, | Performed by: INTERNAL MEDICINE

## 2020-02-11 PROCEDURE — 90471 TDAP VACCINE GREATER THAN OR EQUAL TO 7YO IM: ICD-10-PCS | Mod: S$GLB,,, | Performed by: INTERNAL MEDICINE

## 2020-02-11 PROCEDURE — 90715 TDAP VACCINE GREATER THAN OR EQUAL TO 7YO IM: ICD-10-PCS | Mod: S$GLB,,, | Performed by: INTERNAL MEDICINE

## 2020-02-11 PROCEDURE — 99999 PR PBB SHADOW E&M-EST. PATIENT-LVL IV: CPT | Mod: PBBFAC,,, | Performed by: INTERNAL MEDICINE

## 2020-02-11 PROCEDURE — 99386 PR PREVENTIVE VISIT,NEW,40-64: ICD-10-PCS | Mod: 25,S$GLB,, | Performed by: INTERNAL MEDICINE

## 2020-02-11 PROCEDURE — 90715 TDAP VACCINE 7 YRS/> IM: CPT | Mod: S$GLB,,, | Performed by: INTERNAL MEDICINE

## 2020-02-11 NOTE — PROGRESS NOTES
Subjective:      Patient ID: Tucker Bay is a 60 y.o. male.    Chief Complaint: Establish Care    HPI     59 yo with   Patient Active Problem List   Diagnosis    Hyperlipidemia    Mitral valve prolapse    Family history of premature CAD     Past Medical History:   Diagnosis Date    Family history of premature CAD 8/14/2013    Hyperlipidemia     Mitral valve prolapse      Here today for annual prev exam.  Compliant with meds without significant side effects. Energy and appetite are good.     C/o feeling as something in his throat for several months.   Sometimes cant talk until clears throat.     Social History     Socioeconomic History    Marital status:      Spouse name: Not on file    Number of children: Not on file    Years of education: Not on file    Highest education level: Not on file   Occupational History    Not on file   Social Needs    Financial resource strain: Not hard at all    Food insecurity:     Worry: Never true     Inability: Never true    Transportation needs:     Medical: No     Non-medical: No   Tobacco Use    Smoking status: Never Smoker    Smokeless tobacco: Never Used   Substance and Sexual Activity    Alcohol use: Yes     Alcohol/week: 6.0 standard drinks     Types: 3 Cans of beer, 3 Shots of liquor per week     Frequency: 4 or more times a week     Drinks per session: 1 or 2     Binge frequency: Never    Drug use: No    Sexual activity: Not on file   Lifestyle    Physical activity:     Days per week: 4 days     Minutes per session: 40 min    Stress: To some extent   Relationships    Social connections:     Talks on phone: Three times a week     Gets together: Once a week     Attends Ten[employer] service: Not on file     Active member of club or organization: Yes     Attends meetings of clubs or organizations: More than 4 times per year     Relationship status:    Other Topics Concern    Not on file   Social History Narrative    Not on file     family  "history includes Heart disease in his brother, father, and paternal grandfather.    Review of Systems   Constitutional: Negative for activity change and unexpected weight change.   HENT: Negative for hearing loss, rhinorrhea and trouble swallowing.    Eyes: Negative for discharge and visual disturbance.   Respiratory: Negative for chest tightness and wheezing.    Cardiovascular: Negative for chest pain and palpitations.   Gastrointestinal: Negative for blood in stool, constipation, diarrhea and vomiting.   Endocrine: Negative for polydipsia and polyuria.   Genitourinary: Negative for difficulty urinating, hematuria and urgency.   Musculoskeletal: Negative for arthralgias, joint swelling and neck pain.   Skin: Negative for rash and wound.   Neurological: Negative for weakness and headaches.   Psychiatric/Behavioral: Negative for confusion and dysphoric mood.     Objective:   /74 (BP Location: Left arm, Patient Position: Sitting, BP Method: Large (Manual))   Pulse (!) 59   Temp 97 °F (36.1 °C) (Tympanic)   Ht 5' 7" (1.702 m)   Wt 73.2 kg (161 lb 6 oz)   SpO2 97%   BMI 25.28 kg/m²     Physical Exam   Constitutional: He is oriented to person, place, and time. He appears well-developed and well-nourished. No distress.   HENT:   Head: Normocephalic and atraumatic.   Mouth/Throat: Oropharynx is clear and moist.   Eyes: Pupils are equal, round, and reactive to light. EOM are normal.   Neck: Neck supple. No thyromegaly present.   Cardiovascular: Normal rate and regular rhythm.   Pulmonary/Chest: Breath sounds normal. He has no wheezes. He has no rales.   Abdominal: Soft. Bowel sounds are normal. There is no tenderness.   Musculoskeletal: He exhibits no edema.   Lymphadenopathy:     He has no cervical adenopathy.   Neurological: He is alert and oriented to person, place, and time.   Skin: Skin is warm and dry.   Psychiatric: He has a normal mood and affect. His behavior is normal.       Assessment:     1. Routine " general medical examination at a health care facility    2. Hyperlipidemia, unspecified hyperlipidemia type    3. Need for diphtheria-tetanus-pertussis (Tdap) vaccine    4. Globus sensation      Plan:   Routine general medical examination at a health care facility  Heart healthy diet and reg exercise  HM reviewed    -     Comprehensive metabolic panel; Future; Expected date: 02/11/2020  -     CBC auto differential; Future; Expected date: 02/11/2020  -     TSH; Future; Expected date: 02/11/2020  -     Lipid panel; Future; Expected date: 02/11/2020  -     Hemoglobin A1c; Future; Expected date: 02/11/2020  -     PSA, Screening; Future; Expected date: 02/11/2020  -     Hepatitis C antibody; Future; Expected date: 02/11/2020  -     HIV 1/2 Ag/Ab (4th Gen); Future; Expected date: 02/11/2020    Hyperlipidemia, unspecified hyperlipidemia type  Stable    Need for diphtheria-tetanus-pertussis (Tdap) vaccine  -     (In Office Administered) Tdap Vaccine    Globus sensation  -     Ambulatory referral/consult to ENT; Future; Expected date: 02/18/2020        Lab Frequency Next Occurrence   Comprehensive metabolic panel Once 02/11/2020   CBC auto differential Once 02/11/2020   TSH Once 02/11/2020   Lipid panel Once 02/11/2020   Hemoglobin A1c Once 02/11/2020   PSA, Screening Once 02/11/2020   Hepatitis C antibody Once 02/11/2020   HIV 1/2 Ag/Ab (4th Gen) Once 02/11/2020       Problem List Items Addressed This Visit        Cardiac/Vascular    Hyperlipidemia      Other Visit Diagnoses     Routine general medical examination at a health care facility    -  Primary    Relevant Orders    Comprehensive metabolic panel    CBC auto differential    TSH    Lipid panel    Hemoglobin A1c    PSA, Screening    Hepatitis C antibody    HIV 1/2 Ag/Ab (4th Gen)    Need for diphtheria-tetanus-pertussis (Tdap) vaccine        Relevant Orders    (In Office Administered) Tdap Vaccine (Completed)    Globus sensation        Relevant Orders    Ambulatory  referral/consult to ENT          Follow up in about 1 year (around 2/11/2021), or if symptoms worsen or fail to improve.

## 2020-02-28 ENCOUNTER — LAB VISIT (OUTPATIENT)
Dept: LAB | Facility: HOSPITAL | Age: 60
End: 2020-02-28
Attending: INTERNAL MEDICINE
Payer: COMMERCIAL

## 2020-02-28 ENCOUNTER — OFFICE VISIT (OUTPATIENT)
Dept: OTOLARYNGOLOGY | Facility: CLINIC | Age: 60
End: 2020-02-28
Payer: COMMERCIAL

## 2020-02-28 ENCOUNTER — TELEPHONE (OUTPATIENT)
Dept: OTOLARYNGOLOGY | Facility: CLINIC | Age: 60
End: 2020-02-28

## 2020-02-28 VITALS
WEIGHT: 160.25 LBS | BODY MASS INDEX: 25.1 KG/M2 | DIASTOLIC BLOOD PRESSURE: 71 MMHG | TEMPERATURE: 99 F | SYSTOLIC BLOOD PRESSURE: 108 MMHG

## 2020-02-28 DIAGNOSIS — R09.A2 GLOBUS SENSATION: ICD-10-CM

## 2020-02-28 DIAGNOSIS — K21.9 GASTROESOPHAGEAL REFLUX DISEASE, ESOPHAGITIS PRESENCE NOT SPECIFIED: Primary | ICD-10-CM

## 2020-02-28 DIAGNOSIS — Z00.00 ROUTINE GENERAL MEDICAL EXAMINATION AT A HEALTH CARE FACILITY: ICD-10-CM

## 2020-02-28 DIAGNOSIS — K22.719 BARRETT'S ESOPHAGUS WITH DYSPLASIA: ICD-10-CM

## 2020-02-28 LAB
ALBUMIN SERPL BCP-MCNC: 4.3 G/DL (ref 3.5–5.2)
ALP SERPL-CCNC: 74 U/L (ref 55–135)
ALT SERPL W/O P-5'-P-CCNC: 34 U/L (ref 10–44)
ANION GAP SERPL CALC-SCNC: 8 MMOL/L (ref 8–16)
AST SERPL-CCNC: 21 U/L (ref 10–40)
BASOPHILS # BLD AUTO: 0.06 K/UL (ref 0–0.2)
BASOPHILS NFR BLD: 1.2 % (ref 0–1.9)
BILIRUB SERPL-MCNC: 2.3 MG/DL (ref 0.1–1)
BUN SERPL-MCNC: 17 MG/DL (ref 6–20)
CALCIUM SERPL-MCNC: 9.2 MG/DL (ref 8.7–10.5)
CHLORIDE SERPL-SCNC: 107 MMOL/L (ref 95–110)
CHOLEST SERPL-MCNC: 202 MG/DL (ref 120–199)
CHOLEST/HDLC SERPL: 3 {RATIO} (ref 2–5)
CO2 SERPL-SCNC: 27 MMOL/L (ref 23–29)
COMPLEXED PSA SERPL-MCNC: 0.34 NG/ML (ref 0–4)
CREAT SERPL-MCNC: 1.1 MG/DL (ref 0.5–1.4)
DIFFERENTIAL METHOD: ABNORMAL
EOSINOPHIL # BLD AUTO: 0.2 K/UL (ref 0–0.5)
EOSINOPHIL NFR BLD: 3.1 % (ref 0–8)
ERYTHROCYTE [DISTWIDTH] IN BLOOD BY AUTOMATED COUNT: 12.3 % (ref 11.5–14.5)
EST. GFR  (AFRICAN AMERICAN): >60 ML/MIN/1.73 M^2
EST. GFR  (NON AFRICAN AMERICAN): >60 ML/MIN/1.73 M^2
ESTIMATED AVG GLUCOSE: 91 MG/DL (ref 68–131)
GLUCOSE SERPL-MCNC: 92 MG/DL (ref 70–110)
HBA1C MFR BLD HPLC: 4.8 % (ref 4–5.6)
HCT VFR BLD AUTO: 42.9 % (ref 40–54)
HDLC SERPL-MCNC: 67 MG/DL (ref 40–75)
HDLC SERPL: 33.2 % (ref 20–50)
HGB BLD-MCNC: 14.6 G/DL (ref 14–18)
IMM GRANULOCYTES # BLD AUTO: 0.02 K/UL (ref 0–0.04)
IMM GRANULOCYTES NFR BLD AUTO: 0.4 % (ref 0–0.5)
LDLC SERPL CALC-MCNC: 120.4 MG/DL (ref 63–159)
LYMPHOCYTES # BLD AUTO: 1.2 K/UL (ref 1–4.8)
LYMPHOCYTES NFR BLD: 23.6 % (ref 18–48)
MCH RBC QN AUTO: 32.6 PG (ref 27–31)
MCHC RBC AUTO-ENTMCNC: 34 G/DL (ref 32–36)
MCV RBC AUTO: 96 FL (ref 82–98)
MONOCYTES # BLD AUTO: 0.6 K/UL (ref 0.3–1)
MONOCYTES NFR BLD: 11.2 % (ref 4–15)
NEUTROPHILS # BLD AUTO: 3 K/UL (ref 1.8–7.7)
NEUTROPHILS NFR BLD: 60.5 % (ref 38–73)
NONHDLC SERPL-MCNC: 135 MG/DL
NRBC BLD-RTO: 0 /100 WBC
PLATELET # BLD AUTO: 190 K/UL (ref 150–350)
PMV BLD AUTO: 11.2 FL (ref 9.2–12.9)
POTASSIUM SERPL-SCNC: 4.3 MMOL/L (ref 3.5–5.1)
PROT SERPL-MCNC: 6.9 G/DL (ref 6–8.4)
RBC # BLD AUTO: 4.48 M/UL (ref 4.6–6.2)
SODIUM SERPL-SCNC: 142 MMOL/L (ref 136–145)
TRIGL SERPL-MCNC: 73 MG/DL (ref 30–150)
TSH SERPL DL<=0.005 MIU/L-ACNC: 2.39 UIU/ML (ref 0.4–4)
WBC # BLD AUTO: 4.91 K/UL (ref 3.9–12.7)

## 2020-02-28 PROCEDURE — 80061 LIPID PANEL: CPT

## 2020-02-28 PROCEDURE — 84443 ASSAY THYROID STIM HORMONE: CPT

## 2020-02-28 PROCEDURE — 84153 ASSAY OF PSA TOTAL: CPT

## 2020-02-28 PROCEDURE — 99204 PR OFFICE/OUTPT VISIT, NEW, LEVL IV, 45-59 MIN: ICD-10-PCS | Mod: 25,S$GLB,, | Performed by: OTOLARYNGOLOGY

## 2020-02-28 PROCEDURE — 99999 PR PBB SHADOW E&M-EST. PATIENT-LVL III: CPT | Mod: PBBFAC,,, | Performed by: OTOLARYNGOLOGY

## 2020-02-28 PROCEDURE — 86703 HIV-1/HIV-2 1 RESULT ANTBDY: CPT

## 2020-02-28 PROCEDURE — 99204 OFFICE O/P NEW MOD 45 MIN: CPT | Mod: 25,S$GLB,, | Performed by: OTOLARYNGOLOGY

## 2020-02-28 PROCEDURE — 80053 COMPREHEN METABOLIC PANEL: CPT

## 2020-02-28 PROCEDURE — 85025 COMPLETE CBC W/AUTO DIFF WBC: CPT

## 2020-02-28 PROCEDURE — 86803 HEPATITIS C AB TEST: CPT

## 2020-02-28 PROCEDURE — 83036 HEMOGLOBIN GLYCOSYLATED A1C: CPT

## 2020-02-28 PROCEDURE — 36415 COLL VENOUS BLD VENIPUNCTURE: CPT

## 2020-02-28 PROCEDURE — 31575 PR LARYNGOSCOPY, FLEXIBLE; DIAGNOSTIC: ICD-10-PCS | Mod: S$GLB,,, | Performed by: OTOLARYNGOLOGY

## 2020-02-28 PROCEDURE — 99999 PR PBB SHADOW E&M-EST. PATIENT-LVL III: ICD-10-PCS | Mod: PBBFAC,,, | Performed by: OTOLARYNGOLOGY

## 2020-02-28 PROCEDURE — 31575 DIAGNOSTIC LARYNGOSCOPY: CPT | Mod: S$GLB,,, | Performed by: OTOLARYNGOLOGY

## 2020-02-28 NOTE — TELEPHONE ENCOUNTER
Referral faxed to [phone] Dr. Jeffrey Moore. Patient was provided with a copy of the referral.

## 2020-02-28 NOTE — LETTER
February 28, 2020      Lee John MD  [address] [facility] [place] [address]           [facility]  [address]  [place] [address]  Phone: [phone]  Fax: [phone]          Patient: Tucker Bay   MR Number: [id number]   YOB: 1960   Date of Visit: 2/28/2020       Dear Dr. Lee John:    Thank you for referring Tucker Bay to me for evaluation. Attached you will find relevant portions of my assessment and plan of care.    If you have questions, please do not hesitate to call me. I look forward to following Tucker Bay along with you.    Sincerely,    Siddharth Evans MD    Enclosure  CC:  No Recipients    If you would like to receive this communication electronically, please contact [email or web address] or [phone] to request more information on [facility] Link access.    For providers and/or their staff who would like to refer a patient to [facility], please contact us through our one-stop-shop provider referral line, [facility], at [phone].    If you feel you have received this communication in error or would no longer like to receive these types of communications, please e-mail [email or web address]

## 2020-02-28 NOTE — PROGRESS NOTES
"Referring Provider:    Lee John Md  [address] [facility]  [place] [address]  Subjective:   Patient: Tucker Bay [id number], :1960   Visit date:2020 9:25 PM    Chief Complaint:  Other (globus sensation )    HPI:  Tucker REYES is a 60 y.o. male who I was asked to see in consultation for evaluation of chronic throat discomfort:      Severity: mild  Quality: dull  Voice Quality: clear  Duration: >15 years  Modifying factors:  None   Associated signs and symptoms:  Throat clearing  Post nasal drip or significant rhinorrhea:  No  Bad taste in the back of the mouth: Yes - occasional  Heartburn: Yes - occasional  Number of cups of caffeinated beverages daily: 2  Number of alcoholic beverages daily: 3  Smoking: No  Prior medical therapy:    Proton Pump Inhibitor or H2 Blocker     Hx of EGD 15 years ago.  "Tissue was removed and I was told to stay on prilosec forever".  No follow up scheduled.  At the time, he had severe dysphagia.       Review of Systems:  Negative unless checked off.  Gen:  []fever   []fatigue  HENT:  []nosebleeds  []dental problem   Eyes:  []photophobia  []visual disturbance  Resp:  []chest tightness []wheezing  Card:  []chest pain  []leg swelling  GI:  []abdominal pain []blood in stool  :  []dysuria  []hematuria  Musc:  []joint swelling  []gait problem  Skin:  []color change  []pallor  Neuro:  []seizures  []numbness  Hem:  []bruise/bleed easily  Psych:  []hallucinations  []behavioral problems  Allergy/Imm: has No Known Allergies.    His meds, allergies, medical, surgical, social & family histories were reviewed & updated:  -     He has a current medication list which includes the following prescription(s): omeprazole, simvastatin, and aspirin.  -     He  has a past medical history of Family history of premature CAD (2013), Hyperlipidemia, and Mitral valve prolapse.   -     He does not have any pertinent problems on file.   -     He  has a past surgical history that includes " TONSILLECTOMY, ADENOIDECTOMY.  -     He  reports that he has never smoked. He has never used smokeless tobacco. He reports that he drinks about 6.0 standard drinks of alcohol per week. He reports that he does not use drugs.  -     His family history includes Heart disease in his brother, father, and paternal grandfather.  -     He has No Known Allergies.    Objective:   Physical Exam:  Vitals:  /71   Temp 98.6 °F (37 °C)   Wt 72.7 kg (160 lb 4.4 oz)   BMI 25.10 kg/m²   General appearance:  Well developed, well nourished    Eyes:  Extraocular motions intact, PERRL    Communication:  no hoarseness, no dysphonia    Ears:  Otoscopy of external auditory canals and tympanic membranes was normal, clinical speech reception thresholds grossly intact, no mass/lesion of auricle.  Nose:  No masses/lesions of external nose, nasal mucosa, septum, and turbinates were within normal limits.  Mouth:  No mass/lesion of lips, teeth, gums, hard/soft palate, tongue, tonsils, or oropharynx.    Cardiovascular:  No pedal edema; Radial Pulses +2     Neck & Lymphatics:  No cervical lymphadenopathy, no neck mass/crepitus/ asymmetry, trachea is midline, no thyroid enlargement/tenderness/mass.    Psych: Oriented x3,  Alert with normal mood and affect.     Respiration/Chest:  Symmetric expansion during respiration, normal respiratory effort.    Skin:  Warm and intact. No ulcerations of face, scalp, neck.      Assessment & Plan:       -     Laryngopharyngeal reflux - Tucker REYES has laryngopharyngeal reflux (LPR).  We recommend and discussed with him anti-reflux measures such as raising the head of the bed, avoiding tight clothing or belts, avoiding eating within 3 hours of laying down, and weight loss. Also he should avoid caffeine, peppermints, alcohol and tobacco. H2 blockers (ie. Pepcid) or antacids (ie. Tums) can help. GI referral placed.  Needs EGD and regular follow up.          Siddharth Evans MD, FACS  [facility]   [facility]  [address]  [place] [address]  P: [phone]  F: [phone]    -------------------------------------------------------------------------------------------------------------------        Endoscopic Exam:  Patient: Tucker Bay [id number], :1960  Procedure date:2020  Patient's medications, allergies, past medical, surgical, social and family histories were reviewed and updated as appropriate.  Chief Complaint:  Other (globus sensation )    HPI:  Tucker REYES is a 60 y.o. male with the history of present illness as discussed in the clinic note from today.    Procedure: Risks, benefits, and alternatives of the procedure were discussed with the patient, and the patient consented to the nasal endoscopy.  The nasal cavity was sprayed with a topical decongestant and anesthetic (if needed). The endoscope was passed into each nostril and each nasal cavity was visualized.  On each side the nasal cavity, sinuses (if open), turbinates, and septum were examined with the findings described below. At the end of the examination, the scope was removed. The patient tolerated the procedure well with no complications.       Endoscopic Exam Findings:  -     The right side has normal mucosa  -     The left side has normal mucosa  -     Nasal secretions: No discolored secretions noted bilaterally  -     Nasal septum: no significant deviation or perforation appreciated   -     Inferior turbinate: Normal mucosa without significant hypertrophy bilaterally  -     Middle turbinate: Normal mucosa without significant hypertrophy bilaterally  -     Other findings: No mass or obstructive lesion      -     Laryngeal mucosa is normal  -     Posterior commissure has moderate  hypertrophy  -     Lingual tonsils have no  hypertrophy  -     Adenoids have no hypertrophy  -     Right vocal fold: normal mobility     mass/lesion: none  -     Left vocal fold: normal mobility     mass/lesion: none  -     Other  findings: none    Assessment & Plan:  - see today's clinic note

## 2020-03-02 LAB — HIV 1+2 AB+HIV1 P24 AG SERPL QL IA: NEGATIVE

## 2020-03-03 LAB — HCV AB SERPL QL IA: NEGATIVE

## 2020-03-05 ENCOUNTER — PATIENT MESSAGE (OUTPATIENT)
Dept: OTOLARYNGOLOGY | Facility: CLINIC | Age: 60
End: 2020-03-05

## 2020-03-05 ENCOUNTER — TELEPHONE (OUTPATIENT)
Dept: OTOLARYNGOLOGY | Facility: CLINIC | Age: 60
End: 2020-03-05

## 2020-03-05 NOTE — TELEPHONE ENCOUNTER
Sent message through [employer] letting patient know I have re-sent over all information to Dr. Moore office for scheduling.  states around 3pm would be the best time to call and scheduled d/t clinic will be finish and they can handle phone calls at that time. Information faxed to [phone].

## 2020-03-05 NOTE — TELEPHONE ENCOUNTER
----- Message from Zuleika Bhatia sent at 3/5/2020 11:44 AM CST -----  Contact: pt  Pt stated he would like a call back in regards to the provider you referred him to due to they are not answering him to be scheduled. Pt can be reached at [phone]      Thanks,  Zuleika Bhatia

## 2020-03-27 ENCOUNTER — PATIENT MESSAGE (OUTPATIENT)
Dept: CARDIOLOGY | Facility: CLINIC | Age: 60
End: 2020-03-27

## 2020-04-01 ENCOUNTER — OFFICE VISIT (OUTPATIENT)
Dept: CARDIOLOGY | Facility: CLINIC | Age: 60
End: 2020-04-01
Payer: COMMERCIAL

## 2020-04-01 DIAGNOSIS — Z82.49 FAMILY HISTORY OF PREMATURE CAD: ICD-10-CM

## 2020-04-01 DIAGNOSIS — E78.5 HYPERLIPIDEMIA, UNSPECIFIED HYPERLIPIDEMIA TYPE: Primary | ICD-10-CM

## 2020-04-01 DIAGNOSIS — I34.1 MITRAL VALVE PROLAPSE: ICD-10-CM

## 2020-04-01 PROCEDURE — 99212 OFFICE O/P EST SF 10 MIN: CPT | Mod: 95,,, | Performed by: INTERNAL MEDICINE

## 2020-04-01 PROCEDURE — 99212 PR OFFICE/OUTPT VISIT, EST, LEVL II, 10-19 MIN: ICD-10-PCS | Mod: 95,,, | Performed by: INTERNAL MEDICINE

## 2020-04-01 NOTE — PROGRESS NOTES
Subjective:   Patient ID:  Tucker [place] is a 60 y.o. male who presents for follow up of No chief complaint on file.      HPI  The patient location is: HOME  The chief complaint leading to consultation is: TEST RESULT F/U  Visit type: Virtual visit with synchronous audio 2 WAY TELEPHONE AUDIO CONVERSATION  Total time spent with patient: 10 MINUTES  Each patient to whom he or she provides medical services by telemedicine is:  (1) informed of the relationship between the physician and patient and the respective role of any other health care provider with respect to management of the patient; and (2) notified that he or she may decline to receive medical services by telemedicine and may withdraw from such care at any time.    Notes:     A 61 YO MALE WITH STRONG FH CAD HLP IS HERE TO DISCUSS TESTR CHANCE. HIS LIPIDS HAS GONE UP INCLUDING HIS LDL HE HAS NOT BEEN AS COMPLIANT WITH DIET HE STILL EXERCISES HAS NO ANGINA OR CHF NO SYNCOPE NEAR SYNCOPE NO CHF TIA CLAUDICATION OR PALPITATION. HAS BEEN TAKING MEDS REGULARILY DIET NEEDS INM PROVEMENT. WE REVIEWED LIPID TEST RESULT AS WELL AS A1C TSH AND CHEMISTRY. NO BPO MACHINE AVAILABLE TO DOCUMENT BLOOD PRESSURE.  Past Medical History:   Diagnosis Date    Family history of premature CAD 8/14/2013    Hyperlipidemia     Mitral valve prolapse        Past Surgical History:   Procedure Laterality Date    TONSILLECTOMY, ADENOIDECTOMY         Social History     Tobacco Use    Smoking status: Never Smoker    Smokeless tobacco: Never Used   Substance Use Topics    Alcohol use: Yes     Alcohol/week: 6.0 standard drinks     Types: 3 Cans of beer, 3 Shots of liquor per week     Frequency: 4 or more times a week     Drinks per session: 1 or 2     Binge frequency: Never    Drug use: No       Family History   Problem Relation Age of Onset    Heart disease Father     Heart disease Brother     Heart disease Paternal Grandfather        Current Outpatient Medications   Medication Sig     aspirin (ECOTRIN) 81 MG EC tablet Take 1 tablet (81 mg total) by mouth once daily.    omeprazole (PRILOSEC) 20 MG capsule Take 20 mg by mouth once daily.    simvastatin (ZOCOR) 20 MG tablet TAKE 1 TABLET(20 MG) BY MOUTH EVERY EVENING     No current facility-administered medications for this visit.      Current Outpatient Medications on File Prior to Visit   Medication Sig    aspirin (ECOTRIN) 81 MG EC tablet Take 1 tablet (81 mg total) by mouth once daily.    omeprazole (PRILOSEC) 20 MG capsule Take 20 mg by mouth once daily.    simvastatin (ZOCOR) 20 MG tablet TAKE 1 TABLET(20 MG) BY MOUTH EVERY EVENING     No current facility-administered medications on file prior to visit.      Review of patient's allergies indicates:  No Known Allergies  Review of Systems   Constitution: Negative for malaise/fatigue.   Eyes: Negative for blurred vision.   Cardiovascular: Negative for chest pain, claudication, cyanosis, dyspnea on exertion, irregular heartbeat, leg swelling, near-syncope, orthopnea, palpitations and paroxysmal nocturnal dyspnea.   Respiratory: Negative for cough, hemoptysis and shortness of breath.    Hematologic/Lymphatic: Negative for bleeding problem. Does not bruise/bleed easily.   Skin: Negative for dry skin and itching.   Musculoskeletal: Negative for falls, muscle weakness and myalgias.   Gastrointestinal: Negative for abdominal pain, diarrhea, heartburn, hematemesis, hematochezia and melena.   Genitourinary: Negative for flank pain and hematuria.   Neurological: Negative for dizziness, focal weakness, headaches, light-headedness, numbness, paresthesias, seizures and weakness.   Psychiatric/Behavioral: Negative for altered mental status and memory loss. The patient is not nervous/anxious.    Allergic/Immunologic: Negative for hives.       Objective:   Physical Exam  There were no vitals filed for this visit.  Lab Results   Component Value Date    CHOL 202 (H) 02/28/2020    CHOL 170 03/22/2019     CHOL 147 02/19/2018     Lab Results   Component Value Date    HDL 67 02/28/2020    HDL 64 03/22/2019    HDL 60 02/19/2018     Lab Results   Component Value Date    LDLCALC 120.4 02/28/2020    LDLCALC 92.0 03/22/2019    LDLCALC 77.0 02/19/2018     Lab Results   Component Value Date    TRIG 73 02/28/2020    TRIG 70 03/22/2019    TRIG 50 02/19/2018     Lab Results   Component Value Date    CHOLHDL 33.2 02/28/2020    CHOLHDL 37.6 03/22/2019    CHOLHDL 40.8 02/19/2018       Chemistry        Component Value Date/Time     02/28/2020 0916    K 4.3 02/28/2020 0916     02/28/2020 0916    CO2 27 02/28/2020 0916    BUN 17 02/28/2020 0916    CREATININE 1.1 02/28/2020 0916    GLU 92 02/28/2020 0916        Component Value Date/Time    CALCIUM 9.2 02/28/2020 0916    ALKPHOS 74 02/28/2020 0916    AST 21 02/28/2020 0916    ALT 34 02/28/2020 0916    BILITOT 2.3 (H) 02/28/2020 0916    ESTGFRAFRICA >60.0 02/28/2020 0916    EGFRNONAA >60.0 02/28/2020 0916          Lab Results   Component Value Date    TSH 2.393 02/28/2020     No results found for: INR, PROTIME  Lab Results   Component Value Date    WBC 4.91 02/28/2020    HGB 14.6 02/28/2020    HCT 42.9 02/28/2020    MCV 96 02/28/2020     02/28/2020     BMP  Sodium   Date Value Ref Range Status   02/28/2020 142 136 - 145 mmol/L Final     Potassium   Date Value Ref Range Status   02/28/2020 4.3 3.5 - 5.1 mmol/L Final     Chloride   Date Value Ref Range Status   02/28/2020 107 95 - 110 mmol/L Final     CO2   Date Value Ref Range Status   02/28/2020 27 23 - 29 mmol/L Final     BUN, Bld   Date Value Ref Range Status   02/28/2020 17 6 - 20 mg/dL Final     Creatinine   Date Value Ref Range Status   02/28/2020 1.1 0.5 - 1.4 mg/dL Final     Calcium   Date Value Ref Range Status   02/28/2020 9.2 8.7 - 10.5 mg/dL Final     Anion Gap   Date Value Ref Range Status   02/28/2020 8 8 - 16 mmol/L Final     eGFR if    Date Value Ref Range Status   02/28/2020 >60.0 >60  mL/min/1.73 m^2 Final     eGFR if non    Date Value Ref Range Status   02/28/2020 >60.0 >60 mL/min/1.73 m^2 Final     Comment:     Calculation used to obtain the estimated glomerular filtration  rate (eGFR) is the CKD-EPI equation.        CrCl cannot be calculated (Patient's most recent lab result is older than the maximum 7 days allowed.).    Assessment:     1. Hyperlipidemia, unspecified hyperlipidemia type    2. Mitral valve prolapse    3. Family history of premature CAD      NEEDS BETTER COMPLIANCE AND RESUME REGULAR EXERCISE. WILL REEVALUATE LABS IN 6 MONTHS WITH HSCRP AND MAKE DECISIONS ACCORDINGLY.MAY CONSIDER CA SCORE CT SCAN   Plan:   LIPID CMP HSCRP ON F/U IN 6 MONTHS VISIT.  Continue current therapy  Cardiac low salt diet.  Risk factor modification and excercise program.

## 2020-04-07 ENCOUNTER — PATIENT OUTREACH (OUTPATIENT)
Dept: ADMINISTRATIVE | Facility: HOSPITAL | Age: 60
End: 2020-04-07

## 2020-11-06 ENCOUNTER — TELEPHONE (OUTPATIENT)
Dept: CARDIOLOGY | Facility: CLINIC | Age: 60
End: 2020-11-06

## 2020-11-06 NOTE — TELEPHONE ENCOUNTER
I called pt to schedule past due follow up and labs.   Pt says he has recently moved back to [place]. He is asking if Dr Suarez has any recommendations for a cardiologist to follow in [place]

## 2020-12-09 RX ORDER — SIMVASTATIN 20 MG/1
20 TABLET, FILM COATED ORAL NIGHTLY
Qty: 30 TABLET | Refills: 6 | Status: SHIPPED | OUTPATIENT
Start: 2020-12-09

## 2021-03-25 ENCOUNTER — PATIENT MESSAGE (OUTPATIENT)
Dept: ADMINISTRATIVE | Facility: HOSPITAL | Age: 61
End: 2021-03-25

## 2021-04-26 ENCOUNTER — TELEPHONE (OUTPATIENT)
Dept: CARDIOLOGY | Facility: CLINIC | Age: 61
End: 2021-04-26

## 2021-05-12 ENCOUNTER — PATIENT MESSAGE (OUTPATIENT)
Dept: RESEARCH | Facility: HOSPITAL | Age: 61
End: 2021-05-12

## 2022-07-25 ENCOUNTER — PATIENT OUTREACH (OUTPATIENT)
Dept: ADMINISTRATIVE | Facility: HOSPITAL | Age: 62
End: 2022-07-25
Payer: COMMERCIAL

## 2022-07-25 NOTE — PROGRESS NOTES
Patient called back in and stated that he would like to cancel the appt just made due to the PCP is located in [place] and he currently lives in [place]. Patient stated that he will look for a PCP in his area.

## 2022-07-25 NOTE — PROGRESS NOTES
Called patient to schedule overdue PCP appt, Patient answered, appt scheduled on 8/18/22 @ 3:20pm

## 2022-12-13 ENCOUNTER — PATIENT MESSAGE (OUTPATIENT)
Dept: RESEARCH | Facility: HOSPITAL | Age: 62
End: 2022-12-13
Payer: COMMERCIAL

## 2023-09-07 ENCOUNTER — PATIENT MESSAGE (OUTPATIENT)
Dept: RESEARCH | Facility: HOSPITAL | Age: 63
End: 2023-09-07
Payer: COMMERCIAL

## 2023-10-23 ENCOUNTER — HOSPITAL ENCOUNTER (OUTPATIENT)
Dept: RADIOLOGY | Facility: CLINIC | Age: 63
Discharge: HOME | End: 2023-10-23
Attending: PHYSICIAN ASSISTANT
Payer: COMMERCIAL

## 2023-10-23 ENCOUNTER — OFFICE VISIT (OUTPATIENT)
Dept: ORTHOPEDICS | Facility: CLINIC | Age: 63
End: 2023-10-23
Payer: COMMERCIAL

## 2023-10-23 VITALS — BODY MASS INDEX: 25.17 KG/M2 | HEIGHT: 67 IN | WEIGHT: 160.38 LBS

## 2023-10-23 DIAGNOSIS — M25.532 LEFT WRIST PAIN: ICD-10-CM

## 2023-10-23 DIAGNOSIS — S63.592A SPRAIN OF ULNAR COLLATERAL LIGAMENT OF LEFT WRIST, INITIAL ENCOUNTER: ICD-10-CM

## 2023-10-23 DIAGNOSIS — M25.532 LEFT WRIST PAIN: Primary | ICD-10-CM

## 2023-10-23 PROCEDURE — 99203 PR OFFICE/OUTPT VISIT, NEW, LEVL III, 30-44 MIN: ICD-10-PCS | Mod: ,,, | Performed by: PHYSICIAN ASSISTANT

## 2023-10-23 PROCEDURE — 99203 OFFICE O/P NEW LOW 30 MIN: CPT | Mod: ,,, | Performed by: PHYSICIAN ASSISTANT

## 2023-10-23 PROCEDURE — 1160F PR REVIEW ALL MEDS BY PRESCRIBER/CLIN PHARMACIST DOCUMENTED: ICD-10-PCS | Mod: CPTII,,, | Performed by: PHYSICIAN ASSISTANT

## 2023-10-23 PROCEDURE — 73110 X-RAY EXAM OF WRIST: CPT | Mod: LT,,, | Performed by: PHYSICIAN ASSISTANT

## 2023-10-23 PROCEDURE — 1159F MED LIST DOCD IN RCRD: CPT | Mod: CPTII,,, | Performed by: PHYSICIAN ASSISTANT

## 2023-10-23 PROCEDURE — 3008F PR BODY MASS INDEX (BMI) DOCUMENTED: ICD-10-PCS | Mod: CPTII,,, | Performed by: PHYSICIAN ASSISTANT

## 2023-10-23 PROCEDURE — 1159F PR MEDICATION LIST DOCUMENTED IN MEDICAL RECORD: ICD-10-PCS | Mod: CPTII,,, | Performed by: PHYSICIAN ASSISTANT

## 2023-10-23 PROCEDURE — 1160F RVW MEDS BY RX/DR IN RCRD: CPT | Mod: CPTII,,, | Performed by: PHYSICIAN ASSISTANT

## 2023-10-23 PROCEDURE — 73110 XR WRIST COMPLETE 3 VIEWS LEFT: ICD-10-PCS | Mod: LT,,, | Performed by: PHYSICIAN ASSISTANT

## 2023-10-23 PROCEDURE — 3008F BODY MASS INDEX DOCD: CPT | Mod: CPTII,,, | Performed by: PHYSICIAN ASSISTANT

## 2023-10-23 RX ORDER — ROSUVASTATIN CALCIUM 40 MG/1
40 TABLET, COATED ORAL NIGHTLY
COMMUNITY
Start: 2023-08-07

## 2023-10-23 RX ORDER — MELOXICAM 7.5 MG/1
7.5 TABLET ORAL DAILY
Qty: 30 TABLET | Refills: 1 | Status: SHIPPED | OUTPATIENT
Start: 2023-10-23

## 2023-10-24 NOTE — PROGRESS NOTES
"Chief Complaint:   Chief Complaint   Patient presents with    Injury     L wrist DOI was about 3 wks ago. He was bracing himself. States he fell off a bike. It has not been getting better. States it has been swelling and feeling tender.        History of present illness:    This is a 63 y.o. year old male who complains of left wrist pain.    Patient states he was running his bike and he fell off of the landing on his left wrist in his proximally 3-4 weeks ago.    States he has been having some pain on the ulnar side of his wrist which is not really improved over the past 3-4 weeks.    Denies any swelling numbness or tingling.    Here today for orthopedic evaluation      Current Outpatient Medications   Medication Sig    aspirin (ECOTRIN) 81 MG EC tablet Take 1 tablet (81 mg total) by mouth once daily.    omeprazole (PRILOSEC) 20 MG capsule Take 20 mg by mouth once daily.    rosuvastatin (CRESTOR) 40 MG Tab Take 40 mg by mouth every evening.    meloxicam (MOBIC) 7.5 MG tablet Take 1 tablet (7.5 mg total) by mouth once daily. Take with food    simvastatin (ZOCOR) 20 MG tablet Take 1 tablet (20 mg total) by mouth every evening. (Patient not taking: Reported on 10/23/2023)     No current facility-administered medications for this visit.       Review of Systems:    Constitution:   Denies chills, fever, and sweats.  HENT:   Denies headaches or blurry vision.  Cardiovascular:  Denies chest pain or irregular heart beat.  Respiratory:   Denies cough or shortness of breath.  Gastrointestinal:  Denies abdominal pain, nausea, or vomiting.  Musculoskeletal:   Denies muscle cramps.  Neurological:   Denies dizziness or focal weakness.  Psychiatric/Behavior: Normal mental status.  Hematology/Lymph:  Denies bleeding problem or easy bruising/bleeding.  Skin:    Denies rash or suspicious lesions.    Examination:    Vital Signs:    Vitals:    10/23/23 1528   Weight: 72.8 kg (160 lb 6.4 oz)   Height: 5' 7" (1.702 m)       Body mass " index is 25.12 kg/m².    Constitution:   Well-developed, well nourished patient in no acute distress.  Neurological:   Alert and oriented x 3 and cooperative to examination.     Psychiatric/Behavior: Normal mental status.  Respiratory:   No shortness of breath.  Eyes:    Extraoccular muscles intact  Skin:    No scars, rash or suspicious lesions.    Physical Exam:   Left wrist exam  No obvious deformity.  Negative tenderness over distal radius.  Supination and pronation to 90 degrees and 90 degrees, respectively.  Wrist flexion to 90 degrees and wrist extension to 70 degree.  Negative Tinel's test.  Negative Finkelstein's test.  5/5 strength, normal skin appearance and palpable pulses  No tenderness to palpation over the anatomic snuffbox.    Patient has no tenderness over the wrist joint itself (radiocarpal)    Patient is nontender over the DRUJ  Patient is tender over the ulnar side of the wrist over the ulnar collateral ligament and TFCC area.    There is no swelling noted.    Intact motor and sensation to the wrist and hand      Imaging: X-rays ordered and images interpreted today personally by me of left wrist three views  There is no acute osseous abnormalities noted on his x-ray today.            Assessment: Left wrist pain  -     X-Ray Wrist Complete Left; Future; Expected date: 10/23/2023    Sprain of ulnar collateral ligament of left wrist, initial encounter    Other orders  -     meloxicam (MOBIC) 7.5 MG tablet; Take 1 tablet (7.5 mg total) by mouth once daily. Take with food  Dispense: 30 tablet; Refill: 1         Plan:  At this point the patient wished to try some conservative measures we put him on an anti-inflammatory and a wrist brace for the next 2 weeks.    If his pain begins to improve with these treatments we will continue on a conservative course but if his pain is not starting to alleviate we will get an MRI of his left wrist to look at the ligamentous structures of the ulnar side.    He will  follow up with Dr. Jacob our hand surgeon in 2 weeks          DISCLAIMER: This note may have been dictated using voice recognition software and may contain grammatical errors.     NOTE: Consult report sent to referring provider via [employer].

## 2024-12-26 ENCOUNTER — OFFICE VISIT (OUTPATIENT)
Dept: INTERNAL MEDICINE | Facility: CLINIC | Age: 64
End: 2024-12-26
Payer: COMMERCIAL

## 2024-12-26 VITALS
OXYGEN SATURATION: 100 % | BODY MASS INDEX: 24.96 KG/M2 | SYSTOLIC BLOOD PRESSURE: 122 MMHG | WEIGHT: 159 LBS | DIASTOLIC BLOOD PRESSURE: 74 MMHG | HEART RATE: 58 BPM | HEIGHT: 67 IN | RESPIRATION RATE: 18 BRPM

## 2024-12-26 DIAGNOSIS — Z00.00 WELLNESS EXAMINATION: Primary | ICD-10-CM

## 2024-12-26 DIAGNOSIS — E78.5 HYPERLIPIDEMIA, UNSPECIFIED HYPERLIPIDEMIA TYPE: ICD-10-CM

## 2024-12-26 PROCEDURE — 3008F BODY MASS INDEX DOCD: CPT | Mod: CPTII,,, | Performed by: INTERNAL MEDICINE

## 2024-12-26 PROCEDURE — 3074F SYST BP LT 130 MM HG: CPT | Mod: CPTII,,, | Performed by: INTERNAL MEDICINE

## 2024-12-26 PROCEDURE — 1159F MED LIST DOCD IN RCRD: CPT | Mod: CPTII,,, | Performed by: INTERNAL MEDICINE

## 2024-12-26 PROCEDURE — 3078F DIAST BP <80 MM HG: CPT | Mod: CPTII,,, | Performed by: INTERNAL MEDICINE

## 2024-12-26 PROCEDURE — 99386 PREV VISIT NEW AGE 40-64: CPT | Mod: ,,, | Performed by: INTERNAL MEDICINE

## 2024-12-26 RX ORDER — ASPIRIN 325 MG
325 TABLET ORAL DAILY
COMMUNITY

## 2024-12-26 NOTE — PROGRESS NOTES
"Patient ID: [id number]      Subjective:     Chief Complaint: Establish Care      [place] is a 64 y.o. male.  Is a 64-year-old man who get establish/wellness.  He feels well overall.  He does not smoke but he does drink alcohol in moderation.  He does exercise routinely.  30-45 minutes per day 4-5 days per week.    He is a practicing  is  lives at home with his wife.    He does have history of high cholesterol and takes a statin drug.  Also history of reflux that has controlled with over-the-counter proton pump inhibitor therapy.  He is status post tonsillectomy and hemorrhoidectomy.  He has had screening colonoscopies but his last 1 seems to be 11 years ago.  It sounds like he had small polyps.        Review of Systems    Outpatient Medications Marked as Taking for the 12/26/24 encounter (Office Visit) with Geoff Goel MD   Medication Sig Dispense Refill    aspirin 325 MG tablet Take 325 mg by mouth once daily.      omeprazole (PRILOSEC) 20 MG capsule Take 20 mg by mouth once daily.      rosuvastatin (CRESTOR) 40 MG Tab Take 40 mg by mouth every evening.         Objective:     /74 (BP Location: Right arm, Patient Position: Sitting)   Pulse (!) 58   Resp 18   Ht 5' 7" (1.702 m)   Wt 72.1 kg (159 lb)   SpO2 100%   BMI 24.90 kg/m²     Physical Exam  Vitals reviewed.   Constitutional:       Appearance: Normal appearance.   HENT:      Head: Normocephalic and atraumatic.      Right Ear: Tympanic membrane normal.      Left Ear: Tympanic membrane normal.      Mouth/Throat:      Pharynx: Oropharynx is clear.   Eyes:      Pupils: Pupils are equal, round, and reactive to light.   Neck:      Vascular: No carotid bruit.   Cardiovascular:      Rate and Rhythm: Normal rate and regular rhythm.      Pulses: Normal pulses.      Heart sounds: Normal heart sounds.   Pulmonary:      Effort: Pulmonary effort is normal.      Breath sounds: Normal breath sounds.   Abdominal:      General: Abdomen is " flat.      Palpations: Abdomen is soft. There is no mass.      Tenderness: There is no abdominal tenderness. There is no guarding.   Musculoskeletal:         General: No swelling.      Cervical back: Neck supple.   Lymphadenopathy:      Cervical: No cervical adenopathy.   Skin:     General: Skin is warm and dry.   Neurological:      General: No focal deficit present.      Mental Status: He is alert and oriented to person, place, and time.     Reviewed prior lab tests.    Assessment:     1. Wellness     2. Hyperlipidemia.  On statin therapy.  Numbers not known     3. History of colon polyps.  In need of updated screening colonoscopy     4. Chronic GERD.  Stable on proton pump inhibitor therapy.    5. On aspirin therapy.  Discussed pros and cons of continue therapy.  He has no documented vascular disease that I can see.  He is followed by a local cardiologist, Dr. Jenny Madrid.  Apparently he has been on low-dose aspirin per recommendations of his prior cardiologist and was told recently to go up to 325 for unclear reasons.    Plan:   Update fasting lab work to include CBC CMP lipid TSH and PSA.      Consider stopping aspirin or reducing it to low-dose only.    Refer to GI for colonoscopy.    If lab work looks okay then follow-up with me in 1 year with similar lab.  Problem List Items Addressed This Visit          Cardiac/Vascular    Hyperlipidemia    Relevant Orders    CBC Auto Differential    Comprehensive Metabolic Panel    Lipid Panel    TSH    Urinalysis    PSA, Screening    CBC Auto Differential    Comprehensive Metabolic Panel    Lipid Panel    TSH    Urinalysis    PSA, Screening     Other Visit Diagnoses       Wellness examination    -  Primary    Relevant Orders    Ambulatory referral/consult to Gastroenterology    CBC Auto Differential    Comprehensive Metabolic Panel    Lipid Panel    TSH    Urinalysis    PSA, Screening    CBC Auto Differential    Comprehensive Metabolic Panel    Lipid Panel    TSH     Urinalysis    PSA, Screening             Orders Placed This Encounter   Procedures    CBC Auto Differential     Standing Status:   Future     Standing Expiration Date:   3/26/2025    Comprehensive Metabolic Panel     Standing Status:   Future     Standing Expiration Date:   3/26/2025    Lipid Panel     Standing Status:   Future     Standing Expiration Date:   3/26/2025    TSH     Standing Status:   Future     Standing Expiration Date:   3/26/2025    Urinalysis     Standing Status:   Future     Number of Occurrences:   1     Standing Expiration Date:   12/26/2025    PSA, Screening     Standing Status:   Future     Standing Expiration Date:   6/26/2025    CBC Auto Differential     Standing Status:   Future     Standing Expiration Date:   12/26/2026    Comprehensive Metabolic Panel     Standing Status:   Future     Standing Expiration Date:   12/26/2026    Lipid Panel     Standing Status:   Future     Standing Expiration Date:   12/26/2026    TSH     Standing Status:   Future     Standing Expiration Date:   12/26/2026    Urinalysis     Standing Status:   Future     Number of Occurrences:   1     Standing Expiration Date:   6/26/2026    PSA, Screening     Standing Status:   Future     Standing Expiration Date:   12/26/2026    Ambulatory referral/consult to Gastroenterology     Standing Status:   Future     Standing Expiration Date:   1/26/2026     Referral Priority:   Routine     Referral Type:   Consultation     Referral Reason:   Specialty Services Required     Referred to Provider:   Tarun Mcneil MD     Requested Specialty:   Gastroenterology     Number of Visits Requested:   1        Medication List with Changes/Refills   Current Medications    ASPIRIN 325 MG TABLET    Take 325 mg by mouth once daily.       Start Date: --        End Date: --    OMEPRAZOLE (PRILOSEC) 20 MG CAPSULE    Take 20 mg by mouth once daily.       Start Date: --        End Date: --    ROSUVASTATIN (CRESTOR) 40 MG TAB    Take 40 mg by mouth  every evening.       Start Date: 8/7/2023  End Date: --   Discontinued Medications    ASPIRIN (ECOTRIN) 81 MG EC TABLET    Take 1 tablet (81 mg total) by mouth once daily.       Start Date: 8/14/2013 End Date: 12/26/2024    MELOXICAM (MOBIC) 7.5 MG TABLET    Take 1 tablet (7.5 mg total) by mouth once daily. Take with food       Start Date: 10/23/2023End Date: 12/26/2024    SIMVASTATIN (ZOCOR) 20 MG TABLET    Take 1 tablet (20 mg total) by mouth every evening.       Start Date: 12/9/2020 End Date: 12/26/2024            No follow-ups on file. In addition to their scheduled follow up, the patient has also been instructed to follow up on as needed basis.       Geoff Goel

## 2025-03-24 ENCOUNTER — PATIENT OUTREACH (OUTPATIENT)
Facility: CLINIC | Age: 65
End: 2025-03-24
Payer: COMMERCIAL

## 2025-03-24 LAB — CRC RECOMMENDATION EXT: NORMAL
